# Patient Record
Sex: FEMALE | URBAN - METROPOLITAN AREA
[De-identification: names, ages, dates, MRNs, and addresses within clinical notes are randomized per-mention and may not be internally consistent; named-entity substitution may affect disease eponyms.]

---

## 2023-10-22 ENCOUNTER — OFFICE VISIT (OUTPATIENT)
Dept: URGENT CARE | Facility: CLINIC | Age: 23
End: 2023-10-22
Payer: COMMERCIAL

## 2023-10-22 VITALS
SYSTOLIC BLOOD PRESSURE: 123 MMHG | TEMPERATURE: 97.8 F | HEART RATE: 77 BPM | OXYGEN SATURATION: 98 % | DIASTOLIC BLOOD PRESSURE: 87 MMHG | WEIGHT: 128 LBS

## 2023-10-22 DIAGNOSIS — J30.89 ALLERGIC RHINITIS DUE TO OTHER ALLERGIC TRIGGER, UNSPECIFIED SEASONALITY: Primary | ICD-10-CM

## 2023-10-22 PROCEDURE — 99213 OFFICE O/P EST LOW 20 MIN: CPT | Performed by: PHYSICIAN ASSISTANT

## 2023-10-22 RX ORDER — BROMPHENIRAMINE MALEATE, PSEUDOEPHEDRINE HYDROCHLORIDE, AND DEXTROMETHORPHAN HYDROBROMIDE 2; 30; 10 MG/5ML; MG/5ML; MG/5ML
SYRUP ORAL
COMMUNITY
Start: 2023-09-28 | End: 2023-10-22 | Stop reason: SDUPTHER

## 2023-10-22 RX ORDER — CETIRIZINE HYDROCHLORIDE 10 MG/1
10 TABLET ORAL DAILY
Qty: 30 TABLET | Refills: 0 | Status: SHIPPED | OUTPATIENT
Start: 2023-10-22 | End: 2023-11-21

## 2023-10-22 RX ORDER — FLUTICASONE PROPIONATE 50 MCG
1 SPRAY, SUSPENSION (ML) NASAL DAILY
Qty: 16 G | Refills: 0 | Status: SHIPPED | OUTPATIENT
Start: 2023-10-22 | End: 2023-11-21

## 2023-10-22 RX ORDER — BROMPHENIRAMINE MALEATE, PSEUDOEPHEDRINE HYDROCHLORIDE, AND DEXTROMETHORPHAN HYDROBROMIDE 2; 30; 10 MG/5ML; MG/5ML; MG/5ML
5 SYRUP ORAL 4 TIMES DAILY PRN
Qty: 140 ML | Refills: 0 | Status: SHIPPED | OUTPATIENT
Start: 2023-10-22 | End: 2023-10-29

## 2023-10-22 NOTE — LETTER
October 22, 2023     Patient: Baljit Mathew   YOB: 2000   Date of Visit: 10/22/2023       To Whom It May Concern:    It is my medical opinion that Baljit Mathew may return to work on 10/23/2023 .    If you have any questions or concerns, please don't hesitate to call.         Sincerely,        Magui Zazueta PA-C    CC: No Recipients

## 2023-10-22 NOTE — PROGRESS NOTES
"Vicki Mathew is a 23 y.o. female who presents for Cough.    Pt was seen here at the [facility] on 9/28/23 and was Dx with allergic rhinitis after c/o dry cough that has been ongoing for over 1 month. Pt was Rx Bromfed, which did help, but as soon as the medication was gone \"the cough came back again.\" Did not start the Zyrtec OTC that was recommended. Did not follow up with a PCP. Pt also admits to runny nose, sneezing, itchy eyes, scratchy throat. Denies: fever, chills, headache, dizziness, CP, SOB, abdominal pain, N/V/D, rash, swelling, ear pain, sore throat.        /87   Pulse 77   Temp 36.6 °C (97.8 °F)   Wt 58.1 kg (128 lb)   LMP 10/08/2023   SpO2 98%    All vitals have been reviewed and are stable.    Review of Systems   All other systems reviewed and are negative.      Objective   Physical Exam  Constitutional:       General: She is awake.      Appearance: Normal appearance. She is well-developed.   HENT:      Head: Normocephalic and atraumatic.      Right Ear: Tympanic membrane and ear canal normal.      Left Ear: Tympanic membrane and ear canal normal.      Nose: Rhinorrhea present. Rhinorrhea is clear.      Right Turbinates: Swollen.      Left Turbinates: Swollen.      Comments: Turbinates are boggy/blue.     Mouth/Throat:      Lips: Pink.      Mouth: Mucous membranes are moist.      Pharynx: Oropharynx is clear. Uvula midline.   Cardiovascular:      Rate and Rhythm: Normal rate.   Pulmonary:      Effort: Pulmonary effort is normal.   Musculoskeletal:      Cervical back: Normal range of motion and neck supple.      Right lower leg: No edema.      Left lower leg: No edema.   Skin:     General: Skin is warm and dry.      Findings: No lesion or rash.   Neurological:      General: No focal deficit present.      Mental Status: She is alert and oriented to person, place, and time.      Cranial Nerves: No facial asymmetry.      Motor: Motor function is intact.      Gait: Gait is intact. "   Psychiatric:         Attention and Perception: Attention normal.         Mood and Affect: Mood and affect normal.         Assessment/Plan   Problem List Items Addressed This Visit    None  Visit Diagnoses       Allergic rhinitis due to other allergic trigger, unspecified seasonality    -  Primary    Relevant Medications    fluticasone (Flonase) 50 mcg/actuation nasal spray    cetirizine (ZyrTEC) 10 mg tablet    brompheniramine-pseudoeph-DM 2-30-10 mg/5 mL syrup            Patient education provided to patient and documented in AVS. Red flag symptoms reviewed with patient and all questions answered. Patient or parent/guardian verbalized understanding and agreement with care plan as above. All in office testing reviewed with patient. If symptoms worsen or do not improve, patient is to follow up with PCP or report to the ER.

## 2023-10-22 NOTE — PATIENT INSTRUCTIONS
Your findings on your history and exam are consistent with allergies.   Use Flonase spray every morning.   Use Cetirizine pill every night. It is important that you use this consistently for best results.   Bromfed refilled to be used as needed if these medications alone are not enough.   If the Cetirizine and Flonase are working, you may get these over the counter or you can follow up with a PCP for refills. If these medications are not helping, I would still recommend following up with a PCP so they can further evaluate you.